# Patient Record
Sex: MALE | Employment: FULL TIME | URBAN - METROPOLITAN AREA
[De-identification: names, ages, dates, MRNs, and addresses within clinical notes are randomized per-mention and may not be internally consistent; named-entity substitution may affect disease eponyms.]

---

## 2017-04-11 ENCOUNTER — OFFICE VISIT (OUTPATIENT)
Dept: NEUROLOGY | Facility: CLINIC | Age: 22
End: 2017-04-11
Payer: OTHER GOVERNMENT

## 2017-04-11 ENCOUNTER — HOSPITAL ENCOUNTER (OUTPATIENT)
Dept: NEUROLOGY | Facility: CLINIC | Age: 22
Discharge: HOME | End: 2017-04-11
Payer: OTHER GOVERNMENT

## 2017-04-11 VITALS — SYSTOLIC BLOOD PRESSURE: 115 MMHG | WEIGHT: 205.5 LBS | DIASTOLIC BLOOD PRESSURE: 63 MMHG | HEART RATE: 68 BPM

## 2017-04-11 DIAGNOSIS — R55 SYNCOPE, UNSPECIFIED SYNCOPE TYPE: ICD-10-CM

## 2017-04-11 DIAGNOSIS — R56.9 SEIZURE: ICD-10-CM

## 2017-04-11 DIAGNOSIS — R56.9 CONVULSIONS, UNSPECIFIED CONVULSION TYPE: Primary | ICD-10-CM

## 2017-04-11 PROCEDURE — 95819 PR EEG,W/AWAKE & ASLEEP RECORD: ICD-10-PCS | Mod: 26,S$PBB,, | Performed by: PSYCHIATRY & NEUROLOGY

## 2017-04-11 PROCEDURE — 99203 OFFICE O/P NEW LOW 30 MIN: CPT | Mod: PBBFAC | Performed by: PSYCHIATRY & NEUROLOGY

## 2017-04-11 PROCEDURE — 95816 EEG AWAKE AND DROWSY: CPT | Mod: PBBFAC

## 2017-04-11 PROCEDURE — 95819 EEG AWAKE AND ASLEEP: CPT | Mod: 26,S$PBB,, | Performed by: PSYCHIATRY & NEUROLOGY

## 2017-04-11 PROCEDURE — 99999 PR PBB SHADOW E&M-NEW PATIENT-LVL III: CPT | Mod: PBBFAC,,, | Performed by: PSYCHIATRY & NEUROLOGY

## 2017-04-11 PROCEDURE — 99244 OFF/OP CNSLTJ NEW/EST MOD 40: CPT | Mod: S$PBB,,, | Performed by: PSYCHIATRY & NEUROLOGY

## 2017-04-11 NOTE — PROGRESS NOTES
"[facility] - NEUROLOGY EPILEPSY  [facility] [place]    Date: April 11, 2017   Patient Name: Slava Man   MRN: [id number]   PCP: Jodee Kerr  Referring Provider: Jodee Kerr MD    Assessment:      This is Slava Man, 21 y.o. male with a history of an episode of likely convulsive syncope per history. Of note, the patient has had a normal MRI with a routine EEG showing "focal left and right hemispheric spike and wave" discharges." The original study is not available for my review. It is possible that the discharges seen may have been representative of a benign variant. Additionally, is possible for patients without clinical epilepsy to have incidentally found EEG abnormalities. Per history given, the event is much more suggestive of an episode of convulsive syncope than seizure. Given this, will defer initiation of AED until repeat EEG is performed. I have counseled the patient extensively on seizure precautions as below.   Plan:      -- repeating routine EEG for epileptologist review     I recommended seizure precautions with regards to avoiding unsupervised water recreational activity, climbing or working at heights, operation of heavy or dangerous machinery, caution around fire and sources of high heat, as well as any other activity which could put you at danger in case of a seizure. Taking a bath is generally not recommended for a patient with uncontrolled epilepsy. I also reviewed the [place] DMV law and recommended that the patient not drive for a total of 6 months following his event (from 12/27/17) or following a breakthrough event.    Santi Simmons MD  [facility]   Department of Neurology    Patient note was created using Dragon Dictation.  Any errors in syntax or even information may not have been identified and edited on initial review prior to signing this note.  Subjective:   Patient seen in consultation at the request of Dr. Kerr for the evaluation of " "seizure.  A copy of this note will be sent to the referring physician.      HPI:   Mr. Slava Man is a 21 y.o. male who presents with a chief complaint of an episode of syncope vs. Seizure. On 12/27/17, the patient was traveling through the [place] airport and states he was tired and had not eaten much that day. He was visiting with friends in a smoking lounge when he began to feel "lightheaded." He bent down to drink water when he felt "all of the blood rushing to my face." He attempted to stand up slowly and backed away from the water fountain aware that he might pass out just before he did, in fact, lose consciousness. He states he was told by bystanders that he briefly exhibited jerking of his arms and legs before rapidly regaining consciousness. He exhibited no postictal state, did not bite his tongue, and was not incontinent of urine. He did suffer a small cut to his forehead when he fell. He denies any prior history of seizure or any other risk factors for seizure. He is currently in the [employer] and works in [employer] systems, however his work does sometimes require him to work at heights and other potentially dangerous situations such as climbing at heights to antennas.     Seizure Type: Suspected convulsive syncope  Seizure Etiology: N/A  Current AEDs: None    Event Type 1:   Seizure Description: LOC with brief jerking of extremities  Aura: Prodrome of presyncope  Associated Symptoms: None  Seizure Frequency: One in lifetime  Last seizure: 12/27/17    Handedness: right handed  Seizure Triggers/ Provoking Features: hadn't eaten that day and stress   Seizure Onset Age: 21  Seizure/ Epilepsy Risk Factors: No history of stroke, trauma, mass, intracranial infection, family history of seizure, or childhood seizure  Birth/Developmental History: Normal birth and developmental history  Previous Seizure Medications: None  Other Treatments: None  Episodes of Status: None  Recent Med Changes:    Prior Studies:  EEG : " "per report, mild theta slowing with "focal left and right hemisphere spike and wave activity"  vEEG/ EMU evaluation: Not done  MRI of brain: 3/7/17- report reviewed, WNL  Other studies: Not done  AED levels:  Not done   CT/CTA Scan: Not done  PET Scan: Not done  Neuropsychological Evaluation: Not done  DEXA Scan: Not done    PAST MEDICAL HISTORY:  History reviewed. No pertinent past medical history.    PAST SURGICAL HISTORY:  History reviewed. No pertinent surgical history.    CURRENT MEDS:  No current outpatient prescriptions on file.     No current facility-administered medications for this visit.      ALLERGIES:  Review of patient's allergies indicates:  No Known Allergies    FAMILY HISTORY:  History reviewed. No pertinent family history.    SOCIAL HISTORY:  Social History   Substance Use Topics    Smoking status: Current Some Day Smoker    Smokeless tobacco: None    Alcohol use None       Review of Systems:  12 review of systems is negative except for the symptoms mentioned in HPI.        Objective:     Vitals:    04/11/17 1403   BP: 115/63   Pulse: 68   Weight: 93.2 kg (205 lb 7.5 oz)     General: NAD, well nourished   Eyes: no tearing, discharge, no erythema   ENT: moist mucous membranes of the oral cavity, nares patent    Neck: Supple, Full range of motion  Cardiovascular: Warm and well perfused, pulses equal and symmetrical  Lungs: Normal work of breathing, normal chest wall excursions  Skin: No rash, lesions, or breakdown on exposed skin  Psychiatry: Mood and affect are appropriate   Abdomen: soft, non tender, non distended  Extremeties: No cyanosis, clubbing or edema.    Neurological   MENTAL STATUS: Alert and oriented to person, place, and time. Attention and concentration within normal limits. Speech without dysarthria, able to name and repeat without difficulty. Recent and remote memory within normal limits   CRANIAL NERVES: Visual fields intact. PERRL. EOMI. Facial sensation intact. Face " symmetrical. Hearing grossly intact. Full shoulder shrug bilaterally. Tongue protrudes midline   SENSORY: Sensation is intact to light touch throughout.    MOTOR: Normal bulk and tone. No pronator drift.  5/5 deltoid, biceps, triceps, interosseous, hand  bilaterally. 5/5 iliopsoas, knee extension/flexion, foot dorsi/plantarflexion bilaterally.    REFLEXES: Symmetric and 2+ throughout. Toes down going bilaterally.   CEREBELLAR/COORDINATION/GAIT: Gait steady with normal arm swing and stride length.  Heel to shin intact. Finger to nose intact. Normal rapid alternating movements.

## 2017-04-11 NOTE — PATIENT INSTRUCTIONS
Electroencephalography (EEG)    Electroencephalography (EEG) is a test that measures your brain wave activity. It is used to assess your brain function. Brain cells (or neurons) communicate by producing electrical signals. These signals are measured by the EEG and any abnormalities are detected.  The EEG is safe and painless.  What is EEG used for?  Your doctor may order this test to check for seizures or other brain problems. For this test, several small metal disks (electrodes) are attached to the scalp with adhesives, or with water-based gel or paste. During the test, wavy lines (waveforms) appear on a screen or on paper. They will be studied to assess your brain function. In some people who are prone to seizures, parts of this test may slightly increase their chance of having a seizure. Sometimes it is necessary to repeat an EEG with sleep deprivation. EEG may be performed in a doctor's office or a hospital lab. The test typically takes less than an h our, although much of the time is spent attaching the electrodes. Sometimes, the electrodes are left on for several hours or days so that the EEG test can record brain waves for a longer periods of time. In these cases, you may need to stay in the hospital or can go home with a portable EEG recorder.  Before your test  Prepare for your test as instructed. Wash and dry your hair. But, don't use any hairstyling products. Your scalp and hair should be clean and free of excess oil. Take your routine medications, unless told not to. You may be asked to sleep during the EEG. To help you do this, you may be told to stay up all or part of the night before the test. Or, you may be given medication to help you sleep during the test. If so, someone will need to drive you home after the test. Your test will take about 60 minutes. Arrive with enough time to check in.  My next appointment is:  ______________________________   For your safety and for the success of your test,  tell the technologist about:  · Any medications or herbs you take  · Any seizures you may have had in the past   Date Last Reviewed: 10/19/2015  © 7923-4168 [employer]. [address] [place] [address], [place] [address]. All rights reserved. This information is not intended as a substitute for professional medical care. Always follow your healthcare professional's instructions.

## 2017-04-11 NOTE — LETTER
April 11, 2017      Jodee Kerr MD  [address]  [address]  [place] [address]           [facility] - Neurology Epilepsy  [address]  [facility] [address]  Phone: [phone]  Fax: [phone]          Patient: Slava Man   MR Number: [id number]   YOB: 1995   Date of Visit: 4/11/2017       Dear Dr. Jodee Kerr:    Thank you for referring Slava Man to me for evaluation. Attached you will find relevant portions of my assessment and plan of care.    If you have questions, please do not hesitate to call me. I look forward to following lSava Man along with you.    Sincerely,    Santi Simmons MD    Enclosure  CC:  No Recipients    If you would like to receive this communication electronically, please contact [email or web address] or [phone] to request more information on [facility] access.    For providers and/or their staff who would like to refer a patient to [facility], please contact us through our one-stop-shop provider referral line, [facility] [place], at [phone].    If you feel you have received this communication in error or would no longer like to receive these types of communications, please e-mail [email or web address]

## 2017-04-11 NOTE — MR AVS SNAPSHOT
[facility] - Neurology Epilepsy  [address]  [facility] [address]  Phone: [phone]  Fax: [phone]                  Slava Man   2017 2:00 PM   Office Visit    Description:  Male : 1995   Provider:  Santi Simmons MD   Department:  [facility] - Neurology Epilepsy           Diagnoses this Visit        Comments    Convulsions, unspecified convulsion type    -  Primary     Syncope, unspecified syncope type                To Do List           Goals (5 Years of Data)     None      Follow-Up and Disposition     Return in about 3 months (around 2017).      [facility]     [facility] Nurse Care Line -  Assistance  Unless otherwise directed by your provider, please contact [facility], our nurse care line that is available for  assistance.     Registered nurses in the [facility] provide: appointment scheduling, clinical advisement, health education, and other advisory services.  Call: [phone] (toll free)               Medications           Message regarding Medications     Verify the changes and/or additions to your medication regime listed below are the same as discussed with your clinician today.  If any of these changes or additions are incorrect, please notify your healthcare provider.             Verify that the below list of medications is an accurate representation of the medications you are currently taking.  If none reported, the list may be blank. If incorrect, please contact your healthcare provider. Carry this list with you in case of emergency.                Clinical Reference Information           Your Vitals Were     BP Pulse Weight             115/63 68 93.2 kg (205 lb 7.5 oz)         Blood Pressure          Most Recent Value    BP  115/63      Allergies as of 2017     No Known Allergies      Immunizations Administered on Date of Encounter - 2017     None      Orders Placed During Today's Visit      Future Labs/Procedures Expected by Expires    EEG,w/awake & asleep record  As directed 4/11/2018      [facility] Sign-Up     Activating your [facility] account is as easy as 1-2-3!     1) Visit [email or web address], select Sign Up Now, enter this activation code and your date of birth, then select Next.  [email or web address]  Expires: 5/26/2017  2:30 PM      2) Create a username and password to use when you visit [facility] in the future and select a security question in case you lose your password and select Next.    3) Enter your e-mail address and click Sign Up!    Additional Information  If you have questions, please e-mail [email or web address] or call [phone] to talk to our [facility] staff. Remember, [facility] is NOT to be used for urgent needs. For medical emergencies, dial 911.         Instructions      Electroencephalography (EEG)    Electroencephalography (EEG) is a test that measures your brain wave activity. It is used to assess your brain function. Brain cells (or neurons) communicate by producing electrical signals. These signals are measured by the EEG and any abnormalities are detected.  The EEG is safe and painless.  What is EEG used for?  Your doctor may order this test to check for seizures or other brain problems. For this test, several small metal disks (electrodes) are attached to the scalp with adhesives, or with water-based gel or paste. During the test, wavy lines (waveforms) appear on a screen or on paper. They will be studied to assess your brain function. In some people who are prone to seizures, parts of this test may slightly increase their chance of having a seizure. Sometimes it is necessary to repeat an EEG with sleep deprivation. EEG may be performed in a doctor's office or a hospital lab. The test typically takes less than an h our, although much of the time is spent attaching the electrodes. Sometimes, the electrodes are left on for several hours or days so that the EEG test can record  brain waves for a longer periods of time. In these cases, you may need to stay in the hospital or can go home with a portable EEG recorder.  Before your test  Prepare for your test as instructed. Wash and dry your hair. But, don't use any hairstyling products. Your scalp and hair should be clean and free of excess oil. Take your routine medications, unless told not to. You may be asked to sleep during the EEG. To help you do this, you may be told to stay up all or part of the night before the test. Or, you may be given medication to help you sleep during the test. If so, someone will need to drive you home after the test. Your test will take about 60 minutes. Arrive with enough time to check in.  My next appointment is:  ______________________________   For your safety and for the success of your test, tell the technologist about:  · Any medications or herbs you take  · Any seizures you may have had in the past   Date Last Reviewed: 10/19/2015  © 7848-5191 [employer]. [address] [place] [address]. All rights reserved. This information is not intended as a substitute for professional medical care. Always follow your healthcare professional's instructions.             Smoking Cessation     If you would like to quit smoking:   You may be eligible for free services if you are a [place] resident and started smoking cigarettes before September 1, 1988.  Call the Smoking Cessation Trust ([facility]) toll free at ([phone] or [phone].   Call 1-800-QUIT-NOW if you do not meet the above criteria.   Contact us via email: [email or web address]   View our website for more information: [email or web address]        Language Assistance Services     ATTENTION: Language assistance services are available, free of charge. Please call [phone].      ATENCIÓN: Si habla español, tiene a schneider disposición servicios gratuitos de asistencia lingüística. Llame al [phone].     ROMELIA Ý: N?u  b?n nói Ti?ng Vi?t, có các d?ch v? h? tr? ngôn ng? mi?n phí dành cho b?n. G?i s? [phone].         [facility] - Neurology Epilepsy complies with applicable Federal civil rights laws and does not discriminate on the basis of race, color, national origin, age, disability, or sex.

## 2017-04-12 NOTE — PROCEDURES
DATE OF SERVICE:  04/11/2017    RECORDING TIMES:  Duration is 25 minutes and 32 seconds.    EEG FINDINGS:  At the onset, the patient is asleep with vertex sharp waves, K   complexes and sleep spindles evident on the EEG.  Photic stimulation is   performed, which partially arouses the patient, but he is in and out of stage II   sleep during the actual photic stimulation.  When awake, a well-formed,   well-modulated 10 Hz posterior dominant rhythm is seen best in the occipital   channels.  The patient appears to be extremely tired for this study as evidenced   by difficulty staying awake.  After typically 2 to 3 seconds of well-formed   alpha activity and posterior dominant rhythm, he typically falls back asleep and   K complexes reappear within a very short amount of time and this happens over   and over again during the study, which suggests hypersomnolence.    No epileptiform discharges are seen.  No seizures are seen.  No focal or   lateralizing findings are seen.  EKG strip appears normal.    INTERPRETATION:  Normal, mostly asleep EEG with brief periods of wakefulness.    No epileptiform findings or focal findings were seen.  The patient does appear   to be extremely drowsy at the time of this study and consideration for   pathological hypersomnolence should be given if clinically appropriate.  It was   not known whether or not the patient was sleep-deprived for this particular   study or not.  Clinical correlation is needed for that.      JEANNETTE/JADE  dd: 04/11/2017 16:39:01 (CDT)  td: 04/12/2017 05:29:01 (CDT)  Doc ID   #[id number]  Job ID #[id number]    CC:

## 2017-05-30 ENCOUNTER — DOCUMENTATION ONLY (OUTPATIENT)
Dept: NEUROLOGY | Facility: CLINIC | Age: 22
End: 2017-05-30

## 2017-05-30 ENCOUNTER — TELEPHONE (OUTPATIENT)
Dept: NEUROLOGY | Facility: CLINIC | Age: 22
End: 2017-05-30

## 2017-05-30 NOTE — PROGRESS NOTES
Talked to Dr. Fernandez in [facility]. Patient had a second GTC today, witnessed, postictally confused. Starting Keppra 1500 mg load today then 750 mg BID.

## 2020-01-24 NOTE — TELEPHONE ENCOUNTER
----- Message from Santi Macario sent at 5/30/2017 10:38 AM CDT -----  Contact: Dr. Fernandez with [facility] [phone]  Caller is requesting a return call from Dr. Simmons about the patient recent seizure, caller is requesting a return call before patient  is discharge. pls return call   
Per Dr. Simmons have spoken with patient and Doctor.   
2